# Patient Record
Sex: FEMALE | Race: BLACK OR AFRICAN AMERICAN | NOT HISPANIC OR LATINO | Employment: PART TIME | URBAN - METROPOLITAN AREA
[De-identification: names, ages, dates, MRNs, and addresses within clinical notes are randomized per-mention and may not be internally consistent; named-entity substitution may affect disease eponyms.]

---

## 2023-01-14 ENCOUNTER — HOSPITAL ENCOUNTER (EMERGENCY)
Facility: CLINIC | Age: 23
Discharge: HOME | End: 2023-01-14
Attending: EMERGENCY MEDICINE | Admitting: EMERGENCY MEDICINE
Payer: COMMERCIAL

## 2023-01-14 ENCOUNTER — NURSE TRIAGE (OUTPATIENT)
Dept: NURSING | Facility: CLINIC | Age: 23
End: 2023-01-14

## 2023-01-14 VITALS
HEIGHT: 65 IN | OXYGEN SATURATION: 96 % | TEMPERATURE: 98.2 F | HEART RATE: 89 BPM | BODY MASS INDEX: 37.49 KG/M2 | WEIGHT: 225 LBS | SYSTOLIC BLOOD PRESSURE: 109 MMHG | DIASTOLIC BLOOD PRESSURE: 71 MMHG | RESPIRATION RATE: 18 BRPM

## 2023-01-14 DIAGNOSIS — E11.9 TYPE 2 DIABETES MELLITUS WITHOUT COMPLICATION, WITHOUT LONG-TERM CURRENT USE OF INSULIN (H): ICD-10-CM

## 2023-01-14 LAB
ALBUMIN SERPL-MCNC: 3.6 G/DL (ref 3.4–5)
ALBUMIN UR-MCNC: NEGATIVE MG/DL
ALP SERPL-CCNC: 121 U/L (ref 40–150)
ALT SERPL W P-5'-P-CCNC: 40 U/L (ref 0–50)
ANION GAP SERPL CALCULATED.3IONS-SCNC: 11 MMOL/L (ref 3–14)
APPEARANCE UR: CLEAR
AST SERPL W P-5'-P-CCNC: 20 U/L (ref 0–45)
BASOPHILS # BLD AUTO: 0 10E3/UL (ref 0–0.2)
BASOPHILS NFR BLD AUTO: 1 %
BILIRUB SERPL-MCNC: 0.3 MG/DL (ref 0.2–1.3)
BILIRUB UR QL STRIP: NEGATIVE
BUN SERPL-MCNC: 11 MG/DL (ref 7–30)
CALCIUM SERPL-MCNC: 8.8 MG/DL (ref 8.5–10.1)
CHLORIDE BLD-SCNC: 97 MMOL/L (ref 94–109)
CO2 SERPL-SCNC: 23 MMOL/L (ref 20–32)
COLOR UR AUTO: ABNORMAL
CREAT SERPL-MCNC: 0.76 MG/DL (ref 0.52–1.04)
EOSINOPHIL # BLD AUTO: 0.1 10E3/UL (ref 0–0.7)
EOSINOPHIL NFR BLD AUTO: 1 %
ERYTHROCYTE [DISTWIDTH] IN BLOOD BY AUTOMATED COUNT: 11.7 % (ref 10–15)
GFR SERPL CREATININE-BSD FRML MDRD: >90 ML/MIN/1.73M2
GLUCOSE BLD-MCNC: 681 MG/DL (ref 70–99)
GLUCOSE BLDC GLUCOMTR-MCNC: >600 MG/DL (ref 70–99)
GLUCOSE UR STRIP-MCNC: >=1000 MG/DL
HBA1C MFR BLD: 7.3 % (ref 0–5.6)
HCG SERPL QL: NEGATIVE
HCT VFR BLD AUTO: 42.9 % (ref 35–47)
HGB BLD-MCNC: 14.7 G/DL (ref 11.7–15.7)
HGB UR QL STRIP: NEGATIVE
IMM GRANULOCYTES # BLD: 0 10E3/UL
IMM GRANULOCYTES NFR BLD: 0 %
KETONES BLD-SCNC: 0.1 MMOL/L (ref 0–0.6)
KETONES UR STRIP-MCNC: NEGATIVE MG/DL
LEUKOCYTE ESTERASE UR QL STRIP: NEGATIVE
LYMPHOCYTES # BLD AUTO: 3.1 10E3/UL (ref 0.8–5.3)
LYMPHOCYTES NFR BLD AUTO: 36 %
MCH RBC QN AUTO: 29.7 PG (ref 26.5–33)
MCHC RBC AUTO-ENTMCNC: 34.3 G/DL (ref 31.5–36.5)
MCV RBC AUTO: 87 FL (ref 78–100)
MONOCYTES # BLD AUTO: 0.7 10E3/UL (ref 0–1.3)
MONOCYTES NFR BLD AUTO: 8 %
NEUTROPHILS # BLD AUTO: 4.7 10E3/UL (ref 1.6–8.3)
NEUTROPHILS NFR BLD AUTO: 54 %
NITRATE UR QL: NEGATIVE
NRBC # BLD AUTO: 0 10E3/UL
NRBC BLD AUTO-RTO: 0 /100
PH UR STRIP: 6 [PH] (ref 5–7)
PLATELET # BLD AUTO: 221 10E3/UL (ref 150–450)
POTASSIUM BLD-SCNC: 3.9 MMOL/L (ref 3.4–5.3)
PROT SERPL-MCNC: 7 G/DL (ref 6.8–8.8)
RBC # BLD AUTO: 4.95 10E6/UL (ref 3.8–5.2)
RBC URINE: 1 /HPF
SODIUM SERPL-SCNC: 131 MMOL/L (ref 133–144)
SP GR UR STRIP: 1.03 (ref 1–1.03)
SQUAMOUS EPITHELIAL: 2 /HPF
UROBILINOGEN UR STRIP-MCNC: NORMAL MG/DL
WBC # BLD AUTO: 8.6 10E3/UL (ref 4–11)
WBC URINE: 0 /HPF

## 2023-01-14 PROCEDURE — 81001 URINALYSIS AUTO W/SCOPE: CPT | Performed by: EMERGENCY MEDICINE

## 2023-01-14 PROCEDURE — 84703 CHORIONIC GONADOTROPIN ASSAY: CPT | Performed by: EMERGENCY MEDICINE

## 2023-01-14 PROCEDURE — 258N000003 HC RX IP 258 OP 636: Performed by: EMERGENCY MEDICINE

## 2023-01-14 PROCEDURE — 83036 HEMOGLOBIN GLYCOSYLATED A1C: CPT | Performed by: EMERGENCY MEDICINE

## 2023-01-14 PROCEDURE — 36415 COLL VENOUS BLD VENIPUNCTURE: CPT | Performed by: EMERGENCY MEDICINE

## 2023-01-14 PROCEDURE — 82947 ASSAY GLUCOSE BLOOD QUANT: CPT | Performed by: EMERGENCY MEDICINE

## 2023-01-14 PROCEDURE — 85025 COMPLETE CBC W/AUTO DIFF WBC: CPT | Performed by: EMERGENCY MEDICINE

## 2023-01-14 PROCEDURE — 82010 KETONE BODYS QUAN: CPT | Performed by: EMERGENCY MEDICINE

## 2023-01-14 PROCEDURE — 96360 HYDRATION IV INFUSION INIT: CPT

## 2023-01-14 PROCEDURE — 96361 HYDRATE IV INFUSION ADD-ON: CPT

## 2023-01-14 PROCEDURE — 99284 EMERGENCY DEPT VISIT MOD MDM: CPT | Mod: 25

## 2023-01-14 PROCEDURE — 82962 GLUCOSE BLOOD TEST: CPT

## 2023-01-14 RX ORDER — METFORMIN HCL 500 MG
500 TABLET, EXTENDED RELEASE 24 HR ORAL 2 TIMES DAILY WITH MEALS
Qty: 60 TABLET | Refills: 0 | Status: SHIPPED | OUTPATIENT
Start: 2023-01-14 | End: 2023-02-13

## 2023-01-14 RX ORDER — LIRAGLUTIDE 6 MG/ML
1.8 INJECTION SUBCUTANEOUS DAILY
Qty: 9 ML | Refills: 0 | Status: SHIPPED | OUTPATIENT
Start: 2023-01-14 | End: 2023-02-13

## 2023-01-14 RX ADMIN — SODIUM CHLORIDE 1000 ML: 9 INJECTION, SOLUTION INTRAVENOUS at 03:38

## 2023-01-14 RX ADMIN — SODIUM CHLORIDE 1000 ML: 9 INJECTION, SOLUTION INTRAVENOUS at 02:14

## 2023-01-14 ASSESSMENT — ACTIVITIES OF DAILY LIVING (ADL): ADLS_ACUITY_SCORE: 35

## 2023-01-14 NOTE — ED TRIAGE NOTES
"Pt reports blood sugar \"high\" at home. Has been off insulin for a few years and controlling with diet/exercise. Pt reports lots of poor eating since the New Year. Pt feels thirsty and has a dry mouth.      "

## 2023-01-14 NOTE — ED NOTES
DATE:  1/14/2023   TIME OF RECEIPT FROM LAB:  0346  LAB TEST:  Glucose  LAB VALUE:  681  RESULTS GIVEN WITH READ-BACK TO (PROVIDER):  Yogi Nicholas MD  TIME LAB VALUE REPORTED TO PROVIDER:   0346

## 2023-01-14 NOTE — TELEPHONE ENCOUNTER
"Nurse Triage SBAR    Is this a 2nd Level Triage? NO    Situation: IV site discomfort    Background: patient calling, states that she was in the ED last night. She states that the hand her IV was in feels tingly and cold. She denies swelling, denies redness, denies discharge. She is able to make a fist. She denies any pain in her forearm.     Assessment: IV site discomfort    Protocol Recommended Disposition:   Home Care    Recommendation:     Care advice given per protocol. Patient verbalized undersstanding and agreement with the plan of care. She will call back if her symptoms change and if she has any further questions.      N/A     JUSTEN LAZCANO RN      Does the patient meet one of the following criteria for ADS visit consideration? 16+ years old, with an [facility] PCP     TIP  Providers, please consider if this condition is appropriate for management at one of our Acute and Diagnostic Services sites.     If patient is a good candidate, please use dotphrase <dot>triageresponse and select Refer to ADS to document.    Reason for Disposition    Small area of skin swelling (no redness or pain) at prior IV site    Additional Information    Negative: [1] Similar pain previously AND [2] it was from \"heart attack\"    Negative: [1] Similar pain previously AND [2] it was from \"angina\" AND [3] not relieved by nitroglycerin    Negative: Sounds like a life-threatening emergency to the triager    Negative: Followed a hand or wrist injury    Negative: Chest pain    Negative: Caused by an animal bite    Negative: Caused by a human bite    Negative: Wound looks infected    Negative: Arm pain is main symptom    Negative: Finger pain is main symptom    Negative: [1] Swollen joint AND [2] fever    Negative: [1] Red area or streak AND [2] fever    Negative: Patient sounds very sick or weak to the triager    Negative: [1] SEVERE pain (e.g., excruciating, unable to use hand at all) AND [2] not improved after 2 hours of pain " medicine    Negative: [1] Looks infected (spreading redness, pus) AND [2] large red area (> 2 in. or 5 cm)    Negative: Weakness (i.e., loss of strength) of new-onset in hand or fingers  (Exceptions: not truly weak, hand feels weak because of pain; weakness present > 2 weeks)    Negative: Severe difficulty breathing (e.g., struggling for each breath, speaks in single words)    Negative: Shock suspected (e.g., cold/pale/clammy skin, too weak to stand, low BP, rapid pulse)    Negative: Sounds like a life-threatening emergency to the triager    Negative: IV not running or running slowly    Negative: [1] Difficulty breathing AND [2] not severe    Negative: Arm is swollen, new onset (or leg swelling if IV in lower extremity)    Negative: Fever > 100.4 F (38.0 C)    Negative: Patient sounds very sick or weak to the triager    Negative: Pus or cloudy fluid from IV site    Negative: [1] Red streak at IV site AND [2] palpable cord    Negative: [1] Red streak at IV site AND [2] longer than 1 inch (2.5 cm)    Negative: [1] Skin redness AND [2] extends > 1 inch (2.5 cm) from IV site    Negative: Skin swelling at IV site (Exception: IV recently removed and small area of skin swelling)    Negative: [1] Raised bruise at IV site AND [2] size > 2 inches (5 cm) AND [3] expanding    Negative: [1] Pain at IV site or shooting up arm AND [2] IV running slowly    Negative: [1] Mild bleeding at IV site AND [2] not stopped after following CARE ADVICE    Negative: [1] Dressing needs to be changed (e.g., wet, soiled, loose, or open to air) AND [2] unable or unwilling to perform dressing change    Negative: [1] Small area of skin redness at IV site (<1 inch or 2.5 cm) AND [2] no fever, swelling    Negative: [1] Pain at IV site or shooting up arm AND [2] NO redness or swelling AND [3]  IV running normally    Negative: [1] Pain at IV site or shooting up arm AND [2] NO redness or swelling AND [3] IV has been removed    Negative: Small painless  lump at prior IV site    Protocols used: IV SITE (SKIN) SYMPTOMS-A-AH, HAND AND WRIST PAIN-A-AH

## 2023-01-14 NOTE — ED PROVIDER NOTES
"  History     Chief Complaint:  Hyperglycemia (Pt reports that her blood sugar monitor at home read \"High\". Pt has not been on insulin for a couple of years and has been diet and exercise controlled. She reports that she has been eating and drinking unhealthy since New Years. Lots of sugary drinks and gatorade. Feels thirsty and has a dry mouth.)       HPI   Ayse Deutsch is a 22 year old female past medical history significant for type 2 diabetes that she has been managing without medication presenting for elevated blood sugars without associated nausea vomiting or diarrhea.  No reported fevers, she denies chance that she is pregnant.  She used to be on metformin and Victoza but stopped taking them as her blood sugars have been controlled however she reports dietary indiscretions over the last few days.  She has noted increased thirst, excessive urination but no weight loss.      Independent Historian: Yes    Review of External Notes: Office visit, 8/1820 22 and [facility] they recommended continuation of her medication at that time    ROS:  Review of Systems  10 point ROS completed, negative except as indicated in the HPI.    Allergies:  Penicillins     Medications:    insulin pen needle (32G X 4 MM) 32G X 4 MM miscellaneous  liraglutide (VICTOZA) 18 MG/3ML solution  metFORMIN (GLUCOPHAGE XR) 500 MG 24 hr tablet      Past Medical History:    Diabetes  PCOS    Past Surgical History:    None     Family History:    Diabetes    Social History:  Denies drugs or alcohol  PCP: No primary care provider on file.     Physical Exam   Patient Vitals for the past 24 hrs:   BP Temp Temp src Pulse Resp SpO2 Height Weight   01/14/23 0153 (!) 136/96 98.2  F (36.8  C) Oral 106 18 98 % 1.651 m (5' 5\") 102.1 kg (225 lb)        Physical Exam  Constitutional: Alert, attentive, GCS 15  HENT:    Nose: Nose normal.    Mouth/Throat: Oropharynx is clear, mucous membranes are dry  Eyes: EOM are normal, anicteric, conjugate " gaze  CV: regular rate and rhythm; no murmurs  Chest: Effort normal and breath sounds clear without wheezing or rales, symmetric bilaterally   GI:  non tender. No distension. No guarding or rebound.    MSK: No LE edema, no tenderness to palpation of BLE.  Neurological: Alert, attentive, moving all extremities equally.   Skin: Skin is warm and dry.      Emergency Department Course     Laboratory:  Labs Ordered and Resulted from Time of ED Arrival to Time of ED Departure   ROUTINE UA WITH MICROSCOPIC - Abnormal       Result Value    Color Urine Straw      Appearance Urine Clear      Glucose Urine >=1000 (*)     Bilirubin Urine Negative      Ketones Urine Negative      Specific Gravity Urine 1.027      Blood Urine Negative      pH Urine 6.0      Protein Albumin Urine Negative      Urobilinogen Urine Normal      Nitrite Urine Negative      Leukocyte Esterase Urine Negative      RBC Urine 1      WBC Urine 0      Squamous Epithelials Urine 2 (*)    COMPREHENSIVE METABOLIC PANEL - Abnormal    Sodium 131 (*)     Potassium 3.9      Chloride 97      Carbon Dioxide (CO2) 23      Anion Gap 11      Urea Nitrogen 11      Creatinine 0.76      Calcium 8.8      Glucose 681 (*)     Alkaline Phosphatase 121      AST 20      ALT 40      Protein Total 7.0      Albumin 3.6      Bilirubin Total 0.3      GFR Estimate >90     HEMOGLOBIN A1C - Abnormal    Hemoglobin A1C 7.3 (*)    GLUCOSE BY METER - Abnormal    GLUCOSE BY METER POCT >600 (*)    KETONE BETA-HYDROXYBUTYRATE QUANTITATIVE, RAPID - Normal    Ketone (Beta-Hydroxybutyrate) Quantitative 0.1     HCG QUALITATIVE PREGNANCY - Normal    hCG Serum Qualitative Negative     GLUCOSE MONITOR NURSING POCT   CBC WITH PLATELETS AND DIFFERENTIAL    WBC Count 8.6      RBC Count 4.95      Hemoglobin 14.7      Hematocrit 42.9      MCV 87      MCH 29.7      MCHC 34.3      RDW 11.7      Platelet Count 221      % Neutrophils 54      % Lymphocytes 36      % Monocytes 8      % Eosinophils 1      %  "Basophils 1      % Immature Granulocytes 0      NRBCs per 100 WBC 0      Absolute Neutrophils 4.7      Absolute Lymphocytes 3.1      Absolute Monocytes 0.7      Absolute Eosinophils 0.1      Absolute Basophils 0.0      Absolute Immature Granulocytes 0.0      Absolute NRBCs 0.0              Emergency Department Course & Assessments:    Interventions:  Medications   0.9% sodium chloride BOLUS (0 mLs Intravenous Stopped 23 0334)   0.9% sodium chloride BOLUS (0 mLs Intravenous Stopped 23 1876)        Independent Interpretation (X-rays, CTs, rhythm strip):  NOne     Consultations/Discussion of Management or Tests:  None  I did discuss with her at length that she should use her medications to get her diabetes back under control and then attempt to use diet and exercise.    Social Determinants of Health affecting care:  Poor medication adherence    Disposition:  The patient was discharged to home.     Impression & Plan      Medical Decision Makin-year-old woman past medical history seen for type 2 diabetes not currently on medications that she had been managing it with \"diet and exercise\" who presents for elevated blood sugars reading \"high\" on her home monitor.  Here her blood sugar was confirmed at 681, without evidence of ketosis.  She is complaining mostly of excessive thirst and urinary frequency, UA is without evidence of UTI.  She was given 2 L IV fluids, I will refill her prescriptions for metformin XR and Victoza that she had previously been on, stressed the importance of PCP follow-up    Diagnosis:    ICD-10-CM    1. Type 2 diabetes mellitus without complication, without long-term current use of insulin (H)  E11.9            Discharge Medications:  Discharge Medication List as of 2023  4:35 AM      START taking these medications    Details   insulin pen needle (32G X 4 MM) 32G X 4 MM miscellaneous Use pen needles daily or as directed.Disp-60 each, R-0Local Print      liraglutide (VICTOZA) 18 " MG/3ML solution Inject 1.8 mg Subcutaneous daily for 30 days, Disp-9 mL, R-0, Local Print      metFORMIN (GLUCOPHAGE XR) 500 MG 24 hr tablet Take 1 tablet (500 mg) by mouth 2 times daily (with meals) for 30 days, Disp-60 tablet, R-0, Local Print              Yogi Nicholas MD  [employer]  7:31 AM 01/14/23         Yu, Yogi Castillo MD  01/14/23 07

## 2023-01-14 NOTE — DISCHARGE INSTRUCTIONS
You should drink plenty of nonstructured beverages to stay hydrated, water is best.  I recommend eating a well-balanced diet full of vegetables, whole grains and fruit limiting your added sugar.  You should restart taking your metformin and your Victoza as previously prescribed and I recommend you follow closely with your primary doctor for a recheck.  Should you develop abdominal pain, vomiting, BamBam think your medications you should return here.